# Patient Record
Sex: FEMALE | Race: WHITE
[De-identification: names, ages, dates, MRNs, and addresses within clinical notes are randomized per-mention and may not be internally consistent; named-entity substitution may affect disease eponyms.]

---

## 2023-01-13 ENCOUNTER — HOSPITAL ENCOUNTER (OUTPATIENT)
Dept: HOSPITAL 46 - DS | Age: 58
Discharge: HOME | End: 2023-01-13
Attending: SURGERY
Payer: COMMERCIAL

## 2023-01-13 VITALS — HEIGHT: 63 IN | BODY MASS INDEX: 21.3 KG/M2 | WEIGHT: 120.24 LBS

## 2023-01-13 DIAGNOSIS — I10: ICD-10-CM

## 2023-01-13 DIAGNOSIS — E78.5: ICD-10-CM

## 2023-01-13 DIAGNOSIS — K29.50: Primary | ICD-10-CM

## 2023-01-13 DIAGNOSIS — Z91.199: ICD-10-CM

## 2023-01-13 DIAGNOSIS — Z79.899: ICD-10-CM

## 2023-01-13 DIAGNOSIS — F81.9: ICD-10-CM

## 2023-01-13 DIAGNOSIS — F41.8: ICD-10-CM

## 2023-01-13 DIAGNOSIS — Z88.0: ICD-10-CM

## 2023-01-13 DIAGNOSIS — Z88.8: ICD-10-CM

## 2023-01-13 DIAGNOSIS — E03.9: ICD-10-CM

## 2023-01-13 PROCEDURE — 0DB78ZX EXCISION OF STOMACH, PYLORUS, VIA NATURAL OR ARTIFICIAL OPENING ENDOSCOPIC, DIAGNOSTIC: ICD-10-PCS | Performed by: SURGERY

## 2023-01-13 NOTE — OR
[facility]
                                    [address], [place]  [address]
_________________________________________________________________________________________
                                                                 Signed   
 
 
DATE OF OPERATION:
01/13/2023
 
SURGEON:
Cheng Kerr MD
 
PREOPERATIVE DIAGNOSES:
1. Mild esophageal dysphagia.
2. Cough.
3. Substernal chest pain.
4. Daily NSAID use.
5. Laparoscopic Nissen fundoplication approximately 2018 with Dr. David Lazaro.
 
POSTOPERATIVE DIAGNOSIS:
Moderate amount of retained food in the stomach.
 
PROCEDURE:
EGD with biopsies of the antrum and CLOtest.
 
ESTIMATED BLOOD LOSS:
None.
 
INDICATIONS:
Wandy Santoyo is a 57-year-old female, who came to the office with her caregiver.  She has
a history of intellectual disability with depression, anxiety, medical noncompliance and
hallucinations of her body sensations.  She has also had a laparoscopic Nissen
fundoplication around 2018 with Dr. David Lazaro.  She has been complaining of cough
along with some substernal chest pain.  She always seems to have mild esophageal
dysphagia.  She has been working with her primary care provider.  She is using
diclofenac 3 to 4 times a day for arthritic pain.  She was unable to cooperate well with
a barium swallow last year at [facility] in [place], [place].
Therefore, she has been asked to see me for upper endoscopy.  Dr. Lazaro has since
retired.  In the office, I gave a pamphlet to Wandy Santoyo and her caregiver.  We
reviewed the nature of the upper scope.  There is risk including, but not limited to gas
bloating, crampy abdominal pain, bleeding, perforation requiring surgery, and missed
diagnosis.  We also asked for monitored anesthesia care given her significant medical
history and a long list of medications.  They had expressed understanding and wished to
proceed. 
 
PROCEDURE NOTE:
Wandy Santoyo was taken into our endoscopy suite and placed in the supine semi-recumbent
position.  She was given monitored anesthesia care with propofol per our nurse
 
    Electronically Signed By: CHENG KERR MD  01/13/23 1058
_________________________________________________________________________________________
PATIENT NAME:     CASE,WANDY SHIELDS                      
MEDICAL RECORD #: [id number]            OPERATIVE REPORT              
          ACCT #: [id number]  
DATE OF BIRTH:   [birth year]            REPORT #: [id number]      
PHYSICIAN:        CHENG KERR MD             
PCP:              OTHER PCP                     
REPORT IS CONFIDENTIAL AND NOT TO BE RELEASED WITHOUT AUTHORIZATION
 
 
                                  [facility]
                                    [address], [place]  [address]
_________________________________________________________________________________________
                                                                 Signed   
 
 
anesthetist.  The posterior oropharynx was anesthetized with Hurricaine spray.  A bite
block was utilized for the case.  The adult gastroscope was introduced and advanced
quite readily down into the stomach.  She had a moderate-to-large amount of retained
food.  Apparently, she last ate at 10:00 p.m. the evening before.  I was not able to get
the scope beyond the food into the pylorus and out into the small intestine.  We went
ahead and took a single biopsy of the antrum for CLOtest as well as pathologic review.
We were able to retroflex the scope and there was too much food and fluid up around the
GE junction to see anything of any significance.  It does not appear she has any obvious
recurrent hiatal hernia.  The scope had been withdrawn up through the area of GE
junction, which appears to be compliant without stricture.  Her Z-line remains intact.
The Z-line is approximately 32 cm from her incisors.  There was no Sesay's mucosa.
The distal esophagus was unremarkable.  The middle and upper esophagus were
unremarkable.  After this, the gas had been suctioned out and the gastroscope removed.
Wandy Santoyo tolerated the procedure quite well. 
 
RECOMMENDATIONS:
I will see Wandy Santoyo back in my office in 7 to 14 days to review her results and
biopsies.  She might consider a solid phase gastric emptying scan to see how well her
stomach empties. 
 
 
 
            ________________________________________
            Cheng Kerr MD 
 
 
ALB/MODL
Job #:  [id number]
DD:  01/13/2023 08:41:00
DT:  01/13/2023 09:28:37
 
cc:            SARAH Lobo MD
 
 
Copies:  CHENG KERR MD
~
 
 
 
 
 
    Electronically Signed By: CHENG KERR MD  01/13/23 1058
_________________________________________________________________________________________
PATIENT NAME:     MIGUEL ANGEL,WANDY SHIELDS                      
MEDICAL RECORD #: [id number]            OPERATIVE REPORT              
          ACCT #: [id number]  
DATE OF BIRTH:   [birth year]            REPORT #: [id number]      
PHYSICIAN:        CHENG KERR MD             
PCP:              OTHER PCP                     
REPORT IS CONFIDENTIAL AND NOT TO BE RELEASED WITHOUT AUTHORIZATION

## 2023-01-13 NOTE — NUR
01/13/23 0838 Peggy Rudd
0832PATIENT ARRIVES TO PACU AWAKE BUT DROWSY. DENIES PAIN OR NAUSEA.
RESP EVEN AND UNLABORED, NC AT 4 LITERS, TURNED OFF.

## 2023-01-17 NOTE — PATH
[facility]
                                    [address], [place]  [address]
_________________________________________________________________________________________
                                                                 Signed   
 
 
 
SPECIMEN(S): A ANTRUM/PYLORUS BIOPSY
 
SPECIMEN SOURCE:
A. ANTRUM/PYLORUS BIOPSY
 
CLINICAL HISTORY:
Pre: Esophageal dysphagia, cough, substernal pain.  Post: Retained food.
 
FINAL PATHOLOGIC DIAGNOSIS:
Antrum / pylorus biopsy:
-  Benign gastric antral-type mucosa with focal slight chronic inflammation.
-  Negative for evidence of Helicobacter organisms on routine HE stained 
sections. 
JVR:talita:[id number]
 
MICROSCOPIC EXAMINATION:
Histologic sections of all submitted blocks are examined by light microscopy.  
These findings, together with the gross examination, support the pathologic 
diagnosis. 
 
GROSS DESCRIPTION:
The specimen, labeled and designated "Case, antrum/pylorus biopsy," is received 
in formalin and consists of one tan soft tissue fragment, 0.4 cm. Entirely 
submitted in (A1). 
VB  (under the direct supervision of a pathologist)
The Gross Description was prepared using a voice recognition system. The report 
was reviewed for accuracy; however, sound-alike word errors, addition and/or 
deletions may occur. If there is any 
question about this report, please contact Client Services.
 
PERFORMING LABORATORY:
The technical component was performed by [facility], [address] [place] [id number] (CLIA# [id number]).  Professional interpretation was 
performed by [facility], 
[address] [place] [id number] (CLIA#: [id number]).
 
Diagnostician:  Gibson Gutierres MD
Pathologist
Electronically Signed 01/17/2023
 
 
 
                                                                                    
_________________________________________________________________________________________
PATIENT NAME:     CASE,WANDY SHIELDS                      
MEDICAL RECORD #: [id number]            PATHOLOGY                     
          ACCT #: [id number]       ACCESSION #: [id number]     
DATE OF BIRTH:   [birth year]            REPORT #: [id number]       
PHYSICIAN:        [facility]              
PCP:              OTHER PCP                     
REPORT IS CONFIDENTIAL AND NOT TO BE RELEASED WITHOUT AUTHORIZATION
 
 
                                  [address]
                                  [place], [place]  [address]
_________________________________________________________________________________________
                                                                 Signed   
 
 
Copies:                                
~
 
 
 
 
 
 
 
 
 
 
 
 
 
 
 
 
 
 
 
 
 
 
 
 
 
 
 
 
 
 
 
 
 
 
 
 
 
 
 
 
 
                                                                                    
_________________________________________________________________________________________
PATIENT NAME:     CASE,WANDY SHIELDS                      
MEDICAL RECORD #: [id number]            PATHOLOGY                     
          ACCT #: [id number]       ACCESSION #: [id number]     
DATE OF BIRTH:   [birth year]            REPORT #: [id number]       
PHYSICIAN:        [facility]              
PCP:              OTHER PCP                     
REPORT IS CONFIDENTIAL AND NOT TO BE RELEASED WITHOUT AUTHORIZATION

## 2023-09-28 VITALS — DIASTOLIC BLOOD PRESSURE: 80 MMHG | SYSTOLIC BLOOD PRESSURE: 118 MMHG

## 2023-10-04 ENCOUNTER — HOSPITAL ENCOUNTER (OUTPATIENT)
Dept: HOSPITAL 46 - OPS | Age: 58
Discharge: HOME | End: 2023-10-04
Attending: SURGERY
Payer: COMMERCIAL

## 2023-10-04 VITALS — DIASTOLIC BLOOD PRESSURE: 74 MMHG | SYSTOLIC BLOOD PRESSURE: 121 MMHG

## 2023-10-04 VITALS — HEIGHT: 63 IN | BODY MASS INDEX: 23.05 KG/M2 | WEIGHT: 130.07 LBS

## 2023-10-04 VITALS — DIASTOLIC BLOOD PRESSURE: 78 MMHG | SYSTOLIC BLOOD PRESSURE: 112 MMHG

## 2023-10-04 DIAGNOSIS — E78.1: ICD-10-CM

## 2023-10-04 DIAGNOSIS — Z12.11: Primary | ICD-10-CM

## 2023-10-04 DIAGNOSIS — K21.9: ICD-10-CM

## 2023-10-04 DIAGNOSIS — F81.9: ICD-10-CM

## 2023-10-04 DIAGNOSIS — I10: ICD-10-CM

## 2023-10-04 DIAGNOSIS — E03.9: ICD-10-CM

## 2023-10-04 PROCEDURE — 0DJD8ZZ INSPECTION OF LOWER INTESTINAL TRACT, VIA NATURAL OR ARTIFICIAL OPENING ENDOSCOPIC: ICD-10-PCS | Performed by: SURGERY

## 2023-10-04 NOTE — NUR
PT STATED VERY NERVOUS.  GAVE REASSURANCE.  PT CONSENTED TO PRAYER.  PRAYED
FOR AWARENESS OF DIVINE PRESENCE AND RELIEF FROM ANXIETY.

## 2023-10-05 NOTE — OR
[facility]
                                    [address], [place]  [address]
_________________________________________________________________________________________
                                                                 Signed   
 
 
DATE OF OPERATION:
10/04/2023
 
SURGEON:
Cheng Aranda MD
 
PREOPERATIVE DIAGNOSIS:
Screening colonoscopy.
 
POSTOPERATIVE DIAGNOSIS:
Mild-to-moderate poor bowel prep.
 
PROCEDURE:
Colonoscopy without biopsy.
 
ESTIMATED BLOOD LOSS:
None.
 
INDICATIONS:
Wandy is a 57-year-old lady with developmental delay.  I actually helped her with upper
endoscopy in January of this year.  She has a caregiver, but on this particular occasion
she came with her mother.  Her mother happens to be a nurse assistant who worked in our
preop area for many years.  Wandy does not drive, but she does live alone in her own
apartment.  She has no lower GI complaints.  There is no family history of colon cancer
or polyps.  In the office, I gave them a pamphlet on colonoscopy.  We had compared to
the upper endoscopy.  There is risk including, but not limited to gas bloating, crampy
abdominal pain, bleeding, perforation requiring surgery, and missed diagnosis.  We also
reviewed the written instructions for a bowel prep line by line.  Her mom was able to
help her with the bowel prep.  Wandy told me she has had multiple bowel movements and
it is mostly liquid at this point.  Also because of her significant medical issues, we
have had monitored anesthesia care with IV propofol.  That worked out very nicely for
Wandy back in January and it worked well today.  She and her mom had expressed
understanding and wished to proceed. 
 
PROCEDURE IN DETAIL:
Wandy was taken in the endoscopy suite and placed in the left lateral decubitus
position.  She was given monitored anesthesia care with propofol infusion per our nurse
anesthetist.  A digital rectal exam was performed and this was unremarkable.  There were
no external hemorrhoids.  She had good sphincter tone.  There were no masses.
Unfortunately, she had black liquid particulate stool.  The adult colonoscope had been
introduced and advanced slowly with irrigation and suction up through the rectum and
into the sigmoid colon.  After that it went around into the cecum itself.  It took just
 
    Electronically Signed By: CHENG ARANDA MD  10/05/23 0732
_________________________________________________________________________________________
PATIENT NAME:     CASE,WANDY SHIELDS                      
MEDICAL RECORD #: [id number]            OPERATIVE REPORT              
          ACCT #: [id number]  
DATE OF BIRTH:   [birth year]            REPORT #: [id number]      
PHYSICIAN:        CHENG ARANDA MD             
PCP:              OTHER PCP                     
REPORT IS CONFIDENTIAL AND NOT TO BE RELEASED WITHOUT AUTHORIZATION
 
 
                                  [facility]
                                    [address], [place]  [address]
_________________________________________________________________________________________
                                                                 Signed   
 
 
a little extra sedation abdominal compression in order to advance the scope.  It took a
few minutes to irrigate and suction out the cecum.  We could easily see the appendiceal
orifice and ileocecal valve.  Her ileocecal valve was quite small.  The scope was then
slowly withdrawn and again we had several areas where we had to irrigate and suction out
this black liquid particulate stool matter.  Fortunately, it never clogged our scope.
We went very slowly and took our time and eventually made it all the way back to the
rectum.  We did not find any polyps, masses, or diverticula.  Once in the rectum, the
scope had been retroflexed.  It looks like she might have just a small single internal
hemorrhoid columns.  After this, the gas was suctioned out colonoscope removed.  Wandy
tolerated the procedure quite well. 
 
RECOMMENDATIONS:
Rather return in 10 years Wandy should probably return in 5 years and do a double bowel
prep.  She will need the entire gallon of polyethylene glycol.  She only took half a
gallon on this occasion with dulcolax tablets.  She will always be monitored anesthesia
care. 
 
 
 
            ________________________________________
            Cheng Aranda MD 
 
 
ALB/MODL
Job #:  [id number]
DD:  10/04/2023 09:42:08
DT:  10/04/2023 14:38:28
 
cc:            VIRGIL Lobo [place] 
 
               Cheng Aranda MD
 
 
Copies:  CHENG ARANDA MD
~
 
 
 
 
 
 
 
    Electronically Signed By: CHENG ARANDA MD  10/05/23 0732
_________________________________________________________________________________________
PATIENT NAME:     CASE,WANDY SHIELDS                      
MEDICAL RECORD #: [id number]            OPERATIVE REPORT              
          ACCT #: [id number]  
DATE OF BIRTH:   [birth year]            REPORT #: [id number]      
PHYSICIAN:        CHENG ARANDA MD             
PCP:              OTHER PCP                     
REPORT IS CONFIDENTIAL AND NOT TO BE RELEASED WITHOUT AUTHORIZATION